# Patient Record
Sex: FEMALE | Race: WHITE | NOT HISPANIC OR LATINO | Employment: OTHER | URBAN - METROPOLITAN AREA
[De-identification: names, ages, dates, MRNs, and addresses within clinical notes are randomized per-mention and may not be internally consistent; named-entity substitution may affect disease eponyms.]

---

## 2024-08-11 ENCOUNTER — OFFICE VISIT (OUTPATIENT)
Dept: URGENT CARE | Facility: URGENT CARE | Age: 74
End: 2024-08-11
Payer: COMMERCIAL

## 2024-08-11 VITALS
RESPIRATION RATE: 17 BRPM | OXYGEN SATURATION: 94 % | TEMPERATURE: 97.3 F | HEART RATE: 92 BPM | DIASTOLIC BLOOD PRESSURE: 88 MMHG | WEIGHT: 226.9 LBS | SYSTOLIC BLOOD PRESSURE: 143 MMHG

## 2024-08-11 DIAGNOSIS — R39.9 URINARY SYMPTOM OR SIGN: ICD-10-CM

## 2024-08-11 DIAGNOSIS — N30.01 ACUTE CYSTITIS WITH HEMATURIA: Primary | ICD-10-CM

## 2024-08-11 PROBLEM — M16.12 PRIMARY OSTEOARTHRITIS OF LEFT HIP: Status: ACTIVE | Noted: 2017-12-06

## 2024-08-11 PROBLEM — M77.41 METATARSALGIA OF BOTH FEET: Status: ACTIVE | Noted: 2017-12-12

## 2024-08-11 PROBLEM — M20.5X2 HALLUX LIMITUS OF LEFT FOOT: Status: ACTIVE | Noted: 2017-12-12

## 2024-08-11 PROBLEM — M77.42 METATARSALGIA OF BOTH FEET: Status: ACTIVE | Noted: 2017-12-12

## 2024-08-11 PROBLEM — M24.562 FLEXION CONTRACTURE OF LEFT KNEE: Status: ACTIVE | Noted: 2024-02-19

## 2024-08-11 PROBLEM — Z96.651 STATUS POST TOTAL KNEE REPLACEMENT, RIGHT: Status: ACTIVE | Noted: 2017-03-31

## 2024-08-11 PROBLEM — L71.9 ACNE ROSACEA: Status: ACTIVE | Noted: 2018-06-04

## 2024-08-11 PROBLEM — M20.12 HALLUX VALGUS OF LEFT FOOT: Status: ACTIVE | Noted: 2017-12-12

## 2024-08-11 PROBLEM — Z96.649 HISTORY OF TOTAL HIP REPLACEMENT: Status: ACTIVE | Noted: 2017-03-31

## 2024-08-11 PROBLEM — M47.816 OSTEOARTHRITIS OF LUMBAR SPINE: Status: ACTIVE | Noted: 2024-02-19

## 2024-08-11 PROBLEM — Z91.09 NICKEL ALLERGY: Status: ACTIVE | Noted: 2024-02-19

## 2024-08-11 PROBLEM — M17.12 PRIMARY OSTEOARTHRITIS OF LEFT KNEE: Status: ACTIVE | Noted: 2024-02-19

## 2024-08-11 LAB
BACTERIA #/AREA URNS HPF: ABNORMAL /HPF
RBC #/AREA URNS AUTO: ABNORMAL /HPF
SQUAMOUS #/AREA URNS AUTO: ABNORMAL /LPF
WBC #/AREA URNS AUTO: ABNORMAL /HPF

## 2024-08-11 PROCEDURE — 87186 SC STD MICRODIL/AGAR DIL: CPT | Performed by: PHYSICIAN ASSISTANT

## 2024-08-11 PROCEDURE — 99203 OFFICE O/P NEW LOW 30 MIN: CPT | Performed by: PHYSICIAN ASSISTANT

## 2024-08-11 PROCEDURE — 81015 MICROSCOPIC EXAM OF URINE: CPT

## 2024-08-11 PROCEDURE — 87086 URINE CULTURE/COLONY COUNT: CPT | Performed by: PHYSICIAN ASSISTANT

## 2024-08-11 RX ORDER — MULTIVIT-MIN/IRON FUM/FOLIC AC 7.5 MG-4
1 TABLET ORAL DAILY
COMMUNITY

## 2024-08-11 RX ORDER — CEPHALEXIN 500 MG/1
500 CAPSULE ORAL 2 TIMES DAILY
Qty: 14 CAPSULE | Refills: 0 | Status: SHIPPED | OUTPATIENT
Start: 2024-08-11 | End: 2024-08-18

## 2024-08-11 RX ORDER — AMLODIPINE BESYLATE 2.5 MG/1
2.5 TABLET ORAL DAILY
COMMUNITY
Start: 2024-01-22 | End: 2025-01-21

## 2024-08-11 RX ORDER — ALBUTEROL SULFATE 90 UG/1
1-2 AEROSOL, METERED RESPIRATORY (INHALATION)
COMMUNITY
Start: 2023-10-30

## 2024-08-11 RX ORDER — METHOCARBAMOL 750 MG/1
750 TABLET, FILM COATED ORAL
COMMUNITY
Start: 2024-05-20

## 2024-08-11 RX ORDER — VITAMIN B COMPLEX
1 TABLET ORAL DAILY
COMMUNITY

## 2024-08-11 RX ORDER — HYDROCHLOROTHIAZIDE 25 MG/1
1 TABLET ORAL DAILY
COMMUNITY
Start: 2023-10-12

## 2024-08-11 RX ORDER — FLUTICASONE PROPIONATE 50 MCG
2 SPRAY, SUSPENSION (ML) NASAL
COMMUNITY

## 2024-08-11 ASSESSMENT — ENCOUNTER SYMPTOMS
DYSURIA: 1
FREQUENCY: 1
HEMATURIA: 1
VOMITING: 0
ABDOMINAL PAIN: 0
FEVER: 0
DIARRHEA: 0

## 2024-08-11 NOTE — PROGRESS NOTES
Assessment & Plan:        ICD-10-CM    1. Acute cystitis with hematuria  N30.01 cephALEXin (KEFLEX) 500 MG capsule      2. Urinary symptom or sign  R39.9 UA Macroscopic with reflex to Microscopic and Culture - Lab Collect     UA Microscopic with Reflex to Culture     Urine Culture     CANCELED: UA Macroscopic with reflex to Microscopic and Culture - Lab Collect            Plan/Clinical Decision Making:    Patient with acute urinary symptoms since last night. UA showing RBCs and WBCs.   Normal GFR this year.   Will treat with Keflex course.   Call if not better in 2 days.   Recommend recheck of urine in future due to blood in urine in about 4-6 weeks.       Return if symptoms worsen or fail to improve, for in 2-3 days.     At the end of the encounter, I discussed results, diagnosis, medications. Discussed red flags for immediate return to clinic/ER, as well as indications for follow up if no improvement. Patient understood and agreed to plan. Patient was stable for discharge.        Yady Freitas PA-C on 2024 at 1:21 PM          Subjective:     HPI:    Erna is a 74 year old female who presents to clinic today for the following health issues:  Chief Complaint   Patient presents with    Urgent Care    UTI     Dysuria, low back pain, bladder pressure and urgency started last night   Took Azo around 12:30 PM      HPI    Patient with acute low back pain last night and then developed urinary symptoms last night.   Took AZO to help with symptoms.     Review of Systems   Constitutional:  Negative for fever.   Gastrointestinal:  Negative for abdominal pain, diarrhea and vomiting.   Genitourinary:  Positive for dysuria, frequency, hematuria and urgency.         There is no problem list on file for this patient.       No past medical history on file.    Social History     Tobacco Use    Smoking status: Former     Types: Cigarettes     Start date: 1979     Quit date: 1966     Years since quittin.6     Smokeless tobacco: Never   Substance Use Topics    Alcohol use: Not on file             Objective:     Vitals:    08/11/24 1305   BP: (!) 143/88   BP Location: Right arm   Patient Position: Sitting   Cuff Size: Adult Large   Pulse: 92   Resp: 17   Temp: 97.3  F (36.3  C)   TempSrc: Tympanic   SpO2: 94%   Weight: 102.9 kg (226 lb 14.4 oz)         Physical Exam   EXAM:   Pleasant, alert, appropriate appearance. NAD.  Head Exam: Normocephalic, atraumatic.  ABD: soft, Non-tender,   No CVA tenderness.     Results:  Results for orders placed or performed in visit on 08/11/24   UA Microscopic with Reflex to Culture     Status: Abnormal   Result Value Ref Range    Bacteria Urine Few (A) None Seen /HPF    RBC Urine  (A) 0-2 /HPF /HPF    WBC Urine 25-50 (A) 0-5 /HPF /HPF    Squamous Epithelials Urine Few (A) None Seen /LPF    Narrative    Microscopic exam performed on unspun urine.

## 2024-08-13 LAB — BACTERIA UR CULT: ABNORMAL

## 2024-08-14 ENCOUNTER — TELEPHONE (OUTPATIENT)
Dept: URGENT CARE | Facility: URGENT CARE | Age: 74
End: 2024-08-14
Payer: COMMERCIAL

## 2024-08-14 NOTE — TELEPHONE ENCOUNTER
Patient Returning Call    Reason for call:  patient wants to know what her results are, still having ongoing symptoms of burning, not sure the antibiotic is working     Information relayed to patient:  see above , saw UC     Patient has additional questions:  Yes    What are your questions/concerns:  see above     Who does the patient want to speak with:  Provider or RN    Is an  needed?:  No      Okay to leave a detailed message?: Yes at Cell number on file:    Telephone Information:   Mobile [phone]     Ning Owens/

## 2024-08-14 NOTE — TELEPHONE ENCOUNTER
Please let patient know the bacteria that grew from her urine culture is susceptible to the antibiotic (Keflex) she is currently on.  I recommend finishing the course of the current antibiotic.  For the burning sensation she can take AZO over-the-counter for symptomatic relief.    Oanh Urias PA-C

## 2024-08-15 NOTE — TELEPHONE ENCOUNTER
I called and left a detail msg relaying provider msg about her results and what provider recommended.     TEO Acharya, [facility] August 15, 2024 9:38 AM

## 2024-08-27 ENCOUNTER — OFFICE VISIT (OUTPATIENT)
Dept: URGENT CARE | Facility: CLINIC | Age: 74
End: 2024-08-27
Payer: MEDICARE

## 2024-08-27 VITALS
HEART RATE: 63 BPM | OXYGEN SATURATION: 94 % | RESPIRATION RATE: 16 BRPM | WEIGHT: 226.5 LBS | DIASTOLIC BLOOD PRESSURE: 72 MMHG | SYSTOLIC BLOOD PRESSURE: 140 MMHG | TEMPERATURE: 99 F

## 2024-08-27 DIAGNOSIS — U07.1 COVID-19: Primary | ICD-10-CM

## 2024-08-27 PROCEDURE — 99203 OFFICE O/P NEW LOW 30 MIN: CPT | Mod: GF | Performed by: CASE MANAGER/CARE COORDINATOR

## 2024-08-27 PROCEDURE — 99202 OFFICE O/P NEW SF 15 MIN: CPT | Mod: GF | Performed by: CASE MANAGER/CARE COORDINATOR

## 2024-08-27 PROCEDURE — G0463 HOSPITAL OUTPT CLINIC VISIT: HCPCS

## 2024-08-27 RX ORDER — NIRMATRELVIR AND RITONAVIR 300-100 MG
3 KIT ORAL 2 TIMES DAILY
Qty: 30 TABLET | Refills: 0 | Status: SHIPPED | OUTPATIENT
Start: 2024-08-27 | End: 2024-09-01

## 2024-08-27 RX ORDER — AMLODIPINE BESYLATE 2.5 MG/1
2.5 TABLET ORAL DAILY
COMMUNITY
Start: 2024-01-22 | End: 2025-01-21

## 2024-08-27 RX ORDER — CHOLECALCIFEROL (VITAMIN D3) 25 MCG
1 TABLET ORAL DAILY
COMMUNITY

## 2024-08-27 RX ORDER — ALBUTEROL SULFATE 90 UG/1
1-2 INHALANT RESPIRATORY (INHALATION)
COMMUNITY
Start: 2023-10-30

## 2024-08-27 RX ORDER — HYDROCHLOROTHIAZIDE 25 MG/1
1 TABLET ORAL DAILY
COMMUNITY
Start: 2023-10-12

## 2024-08-27 ASSESSMENT — ENCOUNTER SYMPTOMS
ARTHRALGIAS: 0
MYALGIAS: 1
SHORTNESS OF BREATH: 0
BACK PAIN: 0
CHILLS: 0
NAUSEA: 0
LIGHT-HEADEDNESS: 0
SINUS PRESSURE: 1
FATIGUE: 1
DIZZINESS: 0
TROUBLE SWALLOWING: 0
COLOR CHANGE: 0
FEVER: 0
PALPITATIONS: 0
DIARRHEA: 0
SORE THROAT: 1
APPETITE CHANGE: 1
NECK STIFFNESS: 0
CHEST TIGHTNESS: 0
SINUS PAIN: 1
VOMITING: 0
WHEEZING: 0
ABDOMINAL PAIN: 0
HEADACHES: 0
COUGH: 0
NECK PAIN: 0

## 2024-08-28 NOTE — PATIENT INSTRUCTIONS
Will trial Paxlovid 3 tablets twice daily x 5 days for current COVID-19 infection  Should continue supportive cares including rest, staying well hydrated, saline throat gargles, warm soothing liquids such as tea with honey, throat lozenges, tylenol/ibuprofen as needed for pain/fever, Mucinex and Robitussin Cough Syrup as needed for cough suppression  Continue pushing fluids and staying hydrated, as we discussed a common side effects associated with Paxlovid is diarrhea  If you develop any red flag symptoms such as increasing shortness of breath, trouble breathing, chest pain, heart palpitations, or any concerns please return for prompt reevaluation urgent care or emergency department  Current CDC guidelines associated with COVID-19 state you can return to public after symptoms are improving and you are fever free for 24 hours without fever reduction medication such as Tylenol or ibuprofen

## 2024-08-28 NOTE — PROGRESS NOTES
Subjective      Amy Bates is a 74 y.o. female who presents for Covid-19 infection.        Amy Bates is a 74 y.o. female who presents for sore throat, dry nonproductive cough, and fatigue.  Symptoms started approximately 2 days ago, she performed a home COVID-19 test over the weekend which is negative.  Her cousin who she is currently traveling with tested positive for COVID-19 yesterday after experiencing similar symptoms.  Amy retested today, testing returned positive.  She denies any facial or dental pain, tender swollen lymph nodes, wheezing, or shortness of breath.  Her cousin was started on Paxlovid therapy and has experienced moderate improvement of symptoms.  Amy is interested in starting Paxlovid therapy tonight.  She denies utilizing other medications for symptom relief.  Endorses a history of hypertension, obesity, denies history of asthma or COPD.  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  Blood work at that time was largely reassuring, denies history of renal or hepatic insufficiency.  Denies current usage of blood thinners or previous clotting history.          Review of Systems   Constitutional:  Positive for appetite change and fatigue. Negative for chills and fever.   HENT:  Positive for congestion, sinus pressure, sinus pain and sore throat. Negative for trouble swallowing.    Respiratory:  Negative for cough, chest tightness, shortness of breath and wheezing.    Cardiovascular:  Negative for chest pain and palpitations.   Gastrointestinal:  Negative for abdominal pain, diarrhea, nausea and vomiting.   Musculoskeletal:  Positive for myalgias. Negative for arthralgias, back pain, neck pain and neck stiffness.   Skin:  Negative for color change and rash.   Neurological:  Negative for dizziness, light-headedness and headaches.   All other systems reviewed and are negative.            Objective   /72 (BP Location: Left arm, Patient Position: Sitting, Cuff Size: Long  Adult)   Pulse 63   Temp 37.2 °C (99 °F) (Temporal)   Resp 16   Wt 102.7 kg (226 lb 8 oz)   SpO2 94%     Physical Exam  Constitutional:       General: She is not in acute distress.     Appearance: Normal appearance. She is obese. She is not ill-appearing, toxic-appearing or diaphoretic.   HENT:      Nose: Congestion present. No rhinorrhea.      Mouth/Throat:      Lips: Pink.      Mouth: Mucous membranes are moist.      Pharynx: Uvula midline. Posterior oropharyngeal erythema present. No pharyngeal swelling or oropharyngeal exudate.   Cardiovascular:      Rate and Rhythm: Normal rate and regular rhythm.      Pulses: Normal pulses.      Heart sounds: Normal heart sounds. No murmur heard.  Pulmonary:      Effort: Pulmonary effort is normal. No tachypnea, accessory muscle usage, respiratory distress or retractions.      Breath sounds: Normal breath sounds. No decreased breath sounds, wheezing, rhonchi or rales.   Chest:      Chest wall: No tenderness.   Musculoskeletal:      Cervical back: Normal range of motion and neck supple. No tenderness.   Lymphadenopathy:      Cervical: No cervical adenopathy.   Skin:     General: Skin is warm and dry.      Capillary Refill: Capillary refill takes less than 2 seconds.      Coloration: Skin is not pale.      Findings: No rash.   Neurological:      Mental Status: She is alert and oriented to person, place, and time.      Motor: No weakness.   Psychiatric:         Behavior: Behavior is cooperative.             Assessment/Plan   Diagnoses and all orders for this visit:    COVID-19  -     nirmatrelvir-ritonavir (Paxlovid) 300 mg (150 mg x 2)-100 mg tablets; Take 3 tablets by mouth 2 (two) times a day for 5 days          Amy Bates is a 74 y.o. female who presents after testing positive for COVID-19 today, symptoms started 2 days ago.  Vital signs reassuring.  Heart rate regular, lungs clear bilaterally.  She does occasionally cough during examination, cough is nonproductive.   She is interested in Paxlovid therapy.  Denies previous history of renal or hepatic insufficiency.  Blood work from May 2024 was reassuring.  Discussed current CDC guidelines associated with COVID-19 including latest quarantine guidelines.  Discussed risk versus benefits of trialing Paxlovid, she would like to proceed with this, did discuss it can cause GI upset.  Will trial 3 tablets twice daily x 5 days. Continue symptomatic cares including saline nasal rinse, hot steamy showers, chloraseptic lozenges, salt water gargle, tylenol and ibuprofen for fever/pain, mucolytics, antitussives, and decongestants.  Expected course of this diagnosis discussed with patient.  Educational handout provided.  Discussed worsening signs or symptoms should prompt urgent reevaluation at urgent care or emergency department.  Patient verbalizes understanding and agrees with plan.       Jeffery Quiñones, CNP